# Patient Record
Sex: FEMALE | Race: WHITE | Employment: PART TIME | URBAN - METROPOLITAN AREA
[De-identification: names, ages, dates, MRNs, and addresses within clinical notes are randomized per-mention and may not be internally consistent; named-entity substitution may affect disease eponyms.]

---

## 2023-09-05 ENCOUNTER — HOSPITAL ENCOUNTER (EMERGENCY)
Age: 38
Discharge: HOME | End: 2023-09-05
Attending: STUDENT IN AN ORGANIZED HEALTH CARE EDUCATION/TRAINING PROGRAM
Payer: COMMERCIAL

## 2023-09-05 ENCOUNTER — APPOINTMENT (OUTPATIENT)
Dept: GENERAL RADIOLOGY | Age: 38
End: 2023-09-05
Payer: COMMERCIAL

## 2023-09-05 VITALS
OXYGEN SATURATION: 98 % | WEIGHT: 293 LBS | DIASTOLIC BLOOD PRESSURE: 61 MMHG | TEMPERATURE: 98 F | BODY MASS INDEX: 45.99 KG/M2 | HEIGHT: 67 IN | HEART RATE: 58 BPM | RESPIRATION RATE: 20 BRPM | SYSTOLIC BLOOD PRESSURE: 116 MMHG

## 2023-09-05 DIAGNOSIS — R07.9 CHEST PAIN, UNSPECIFIED TYPE: Primary | ICD-10-CM

## 2023-09-05 LAB
ALBUMIN SERPL-MCNC: 4.4 G/DL (ref 3.4–5)
ALBUMIN/GLOB SERPL: 1.7 {RATIO} (ref 1.1–2.2)
ALP SERPL-CCNC: 48 U/L (ref 40–129)
ALT SERPL-CCNC: 13 U/L (ref 10–40)
ANION GAP SERPL CALCULATED.3IONS-SCNC: 8 MMOL/L (ref 3–16)
AST SERPL-CCNC: 14 U/L (ref 15–37)
BASOPHILS # BLD: 0.1 K/UL (ref 0–0.2)
BASOPHILS NFR BLD: 0.7 %
BILIRUB SERPL-MCNC: 0.5 MG/DL (ref 0–1)
BUN SERPL-MCNC: 20 MG/DL (ref 7–20)
CALCIUM SERPL-MCNC: 8.9 MG/DL (ref 8.3–10.6)
CHLORIDE SERPL-SCNC: 108 MMOL/L (ref 99–110)
CO2 SERPL-SCNC: 27 MMOL/L (ref 21–32)
CREAT SERPL-MCNC: 0.5 MG/DL (ref 0.6–1.1)
DEPRECATED RDW RBC AUTO: 13.1 % (ref 12.4–15.4)
EKG ATRIAL RATE: 52 BPM
EKG DIAGNOSIS: NORMAL
EKG P AXIS: 44 DEGREES
EKG P-R INTERVAL: 160 MS
EKG Q-T INTERVAL: 468 MS
EKG QRS DURATION: 82 MS
EKG QTC CALCULATION (BAZETT): 435 MS
EKG R AXIS: 21 DEGREES
EKG T AXIS: 0 DEGREES
EKG VENTRICULAR RATE: 52 BPM
EOSINOPHIL # BLD: 0.1 K/UL (ref 0–0.6)
EOSINOPHIL NFR BLD: 1.5 %
GFR SERPLBLD CREATININE-BSD FMLA CKD-EPI: >60 ML/MIN/{1.73_M2}
GLUCOSE SERPL-MCNC: 102 MG/DL (ref 70–99)
HCT VFR BLD AUTO: 37.6 % (ref 36–48)
HGB BLD-MCNC: 13 G/DL (ref 12–16)
LIPASE SERPL-CCNC: 61 U/L (ref 13–60)
LYMPHOCYTES # BLD: 2.6 K/UL (ref 1–5.1)
LYMPHOCYTES NFR BLD: 33.1 %
MCH RBC QN AUTO: 29.4 PG (ref 26–34)
MCHC RBC AUTO-ENTMCNC: 34.6 G/DL (ref 31–36)
MCV RBC AUTO: 84.9 FL (ref 80–100)
MONOCYTES # BLD: 0.7 K/UL (ref 0–1.3)
MONOCYTES NFR BLD: 9.5 %
NEUTROPHILS # BLD: 4.3 K/UL (ref 1.7–7.7)
NEUTROPHILS NFR BLD: 55.2 %
NT-PROBNP SERPL-MCNC: 60 PG/ML (ref 0–124)
PLATELET # BLD AUTO: 229 K/UL (ref 135–450)
PMV BLD AUTO: 8.1 FL (ref 5–10.5)
POTASSIUM SERPL-SCNC: 4.1 MMOL/L (ref 3.5–5.1)
PROT SERPL-MCNC: 7 G/DL (ref 6.4–8.2)
RBC # BLD AUTO: 4.43 M/UL (ref 4–5.2)
SODIUM SERPL-SCNC: 143 MMOL/L (ref 136–145)
TROPONIN, HIGH SENSITIVITY: <6 NG/L (ref 0–14)
TROPONIN, HIGH SENSITIVITY: <6 NG/L (ref 0–14)
WBC # BLD AUTO: 7.7 K/UL (ref 4–11)

## 2023-09-05 PROCEDURE — 80053 COMPREHEN METABOLIC PANEL: CPT

## 2023-09-05 PROCEDURE — 99285 EMERGENCY DEPT VISIT HI MDM: CPT

## 2023-09-05 PROCEDURE — 6370000000 HC RX 637 (ALT 250 FOR IP): Performed by: STUDENT IN AN ORGANIZED HEALTH CARE EDUCATION/TRAINING PROGRAM

## 2023-09-05 PROCEDURE — 93010 ELECTROCARDIOGRAM REPORT: CPT | Performed by: INTERNAL MEDICINE

## 2023-09-05 PROCEDURE — 71046 X-RAY EXAM CHEST 2 VIEWS: CPT

## 2023-09-05 PROCEDURE — 93005 ELECTROCARDIOGRAM TRACING: CPT | Performed by: STUDENT IN AN ORGANIZED HEALTH CARE EDUCATION/TRAINING PROGRAM

## 2023-09-05 PROCEDURE — 36415 COLL VENOUS BLD VENIPUNCTURE: CPT

## 2023-09-05 PROCEDURE — 83880 ASSAY OF NATRIURETIC PEPTIDE: CPT

## 2023-09-05 PROCEDURE — 85025 COMPLETE CBC W/AUTO DIFF WBC: CPT

## 2023-09-05 PROCEDURE — 84484 ASSAY OF TROPONIN QUANT: CPT

## 2023-09-05 PROCEDURE — 83690 ASSAY OF LIPASE: CPT

## 2023-09-05 RX ORDER — CETIRIZINE HYDROCHLORIDE 10 MG/1
10 TABLET ORAL DAILY
COMMUNITY
Start: 2021-08-04

## 2023-09-05 RX ORDER — ASPIRIN 81 MG/1
324 TABLET, CHEWABLE ORAL ONCE
Status: COMPLETED | OUTPATIENT
Start: 2023-09-05 | End: 2023-09-05

## 2023-09-05 RX ADMIN — ASPIRIN 324 MG: 81 TABLET, CHEWABLE ORAL at 01:47

## 2023-09-05 ASSESSMENT — PAIN DESCRIPTION - PAIN TYPE: TYPE: ACUTE PAIN

## 2023-09-05 ASSESSMENT — PAIN - FUNCTIONAL ASSESSMENT
PAIN_FUNCTIONAL_ASSESSMENT: NONE - DENIES PAIN
PAIN_FUNCTIONAL_ASSESSMENT: 0-10
PAIN_FUNCTIONAL_ASSESSMENT: ACTIVITIES ARE NOT PREVENTED

## 2023-09-05 ASSESSMENT — PAIN DESCRIPTION - FREQUENCY: FREQUENCY: INTERMITTENT

## 2023-09-05 ASSESSMENT — PAIN DESCRIPTION - DESCRIPTORS: DESCRIPTORS: CRAMPING;SHARP

## 2023-09-05 ASSESSMENT — PAIN DESCRIPTION - ONSET: ONSET: AWAKENED FROM SLEEP

## 2023-09-05 ASSESSMENT — PAIN DESCRIPTION - LOCATION: LOCATION: CHEST;BREAST

## 2023-09-05 ASSESSMENT — PAIN DESCRIPTION - ORIENTATION: ORIENTATION: LEFT

## 2023-09-05 ASSESSMENT — HEART SCORE: ECG: 1

## 2023-09-05 ASSESSMENT — PAIN SCALES - GENERAL: PAINLEVEL_OUTOF10: 1

## 2023-09-05 NOTE — DISCHARGE INSTRUCTIONS
Take your medications as prescribed. Follow-up with your family doctor as discussed for evaluation of your symptoms as well as your recent visit here to this emergency department. Discussed possible testing of your thyroid. Return if you develop any new or worsening symptoms.

## 2023-09-05 NOTE — ED PROVIDER NOTES
[address] [place] [address]      EMERGENCY MEDICINE     Pt Name: Erika Erwin  MRN: [id number]  [address] [facility] 1985  Date of evaluation: 9/5/2023  Provider: Pablito Demarco MD    CHIEF COMPLAINT       Chief Complaint   Patient presents with    Chest Pain     Pt states she was awakened by sternal chest pain which was low center radiating around left breast. She states she got up and took TUMS without relief and vomited x 1. Upon arrival pain had subsided to a 1/10. No hx of chest pain per pt. HISTORY OF PRESENT ILLNESS   Erika Erwin is a 34 y.o. female who presents to the emergency department for substernal chest pain began a few hours ago when returning to sleep, rating to her left side of her chest wall nonexertional nonpleuritic in nature but associated with 1 episode of nonbilious nonbloody emesis and some diaphoresis. The pain is significantly improved now and is no longer significantly present. No family history of any cardiac disease. No cough runny nose or fever.       PASTMEDICAL HISTORY     Past Medical History:   Diagnosis Date    Irritable bowel syndrome     Morbid obesity with body mass index of 60.0-69.9 in adult [facility])     Vitamin D deficiency        Patient Active Problem List   Diagnosis Code    Chronic back pain M54.9, G89.29    Irritable bowel syndrome K58.9    Vitamin D deficiency E55.9    Morbid obesity with body mass index of 60.0-69.9 in adult ([address] [facility]) E66.01, Z68.44     SURGICAL HISTORY       Past Surgical History:   Procedure Laterality Date    ELBOW SURGERY  07/01/2004    GOOD PÉREZ    GASTRIC RESTRICTION SURGERY         CURRENT MEDICATIONS       Previous Medications    ACYCLOVIR (ZOVIRAX) 200 MG CAPSULE    Take 1 capsule by mouth daily    CETIRIZINE (ZYRTEC) 10 MG TABLET    Take 1 tablet by mouth daily    ERGOCALCIFEROL (DRISDOL) 34167 UNITS CAPSULE    Take 1 capsule by mouth once a week    LEVONORGESTREL (MIRENA) IUD 52 MG    1 Intra Uterine Device by

## 2023-12-23 ENCOUNTER — HOSPITAL ENCOUNTER (OUTPATIENT)
Facility: HOSPITAL | Age: 38
Discharge: HOME | End: 2023-12-25
Attending: EMERGENCY MEDICINE | Admitting: FAMILY MEDICINE

## 2023-12-23 DIAGNOSIS — R07.9 CHEST PAIN: ICD-10-CM

## 2023-12-23 DIAGNOSIS — R10.13 EPIGASTRIC PAIN: ICD-10-CM

## 2023-12-23 DIAGNOSIS — R55 SYNCOPE, UNSPECIFIED SYNCOPE TYPE: Primary | ICD-10-CM

## 2023-12-23 DIAGNOSIS — R53.1 WEAKNESS: ICD-10-CM

## 2023-12-23 PROBLEM — R10.9 ABDOMINAL PAIN: Status: ACTIVE | Noted: 2023-12-23

## 2023-12-23 LAB
ALBUMIN SERPL BCP-MCNC: 4.2 G/DL (ref 3.5–5.2)
ALP SERPL-CCNC: 118 U/L (ref 55–135)
ALT SERPL W/O P-5'-P-CCNC: 52 U/L (ref 10–44)
AMPHET+METHAMPHET UR QL: NEGATIVE
ANION GAP SERPL CALC-SCNC: 12 MMOL/L (ref 8–16)
AST SERPL-CCNC: 23 U/L (ref 10–40)
B-HCG UR QL: NEGATIVE
BARBITURATES UR QL SCN>200 NG/ML: NEGATIVE
BASOPHILS # BLD AUTO: 0.07 K/UL (ref 0–0.2)
BASOPHILS NFR BLD: 0.6 % (ref 0–1.9)
BENZODIAZ UR QL SCN>200 NG/ML: NEGATIVE
BILIRUB SERPL-MCNC: 0.5 MG/DL (ref 0.1–1)
BILIRUB UR QL STRIP: NEGATIVE
BNP SERPL-MCNC: 39 PG/ML (ref 0–99)
BUN SERPL-MCNC: 12 MG/DL (ref 6–20)
BZE UR QL SCN: NEGATIVE
CALCIUM SERPL-MCNC: 9.6 MG/DL (ref 8.7–10.5)
CANNABINOIDS UR QL SCN: NEGATIVE
CHLORIDE SERPL-SCNC: 105 MMOL/L (ref 95–110)
CLARITY UR: ABNORMAL
CO2 SERPL-SCNC: 24 MMOL/L (ref 23–29)
COLOR UR: YELLOW
CREAT SERPL-MCNC: 0.8 MG/DL (ref 0.5–1.4)
CREAT UR-MCNC: 147 MG/DL (ref 15–325)
DIFFERENTIAL METHOD: ABNORMAL
EOSINOPHIL # BLD AUTO: 0.1 K/UL (ref 0–0.5)
EOSINOPHIL NFR BLD: 1 % (ref 0–8)
ERYTHROCYTE [DISTWIDTH] IN BLOOD BY AUTOMATED COUNT: 12.8 % (ref 11.5–14.5)
EST. GFR  (NO RACE VARIABLE): >60 ML/MIN/1.73 M^2
ETHANOL SERPL-MCNC: <10 MG/DL
GLUCOSE SERPL-MCNC: 109 MG/DL (ref 70–110)
GLUCOSE UR QL STRIP: NEGATIVE
HCT VFR BLD AUTO: 42.2 % (ref 37–48.5)
HCV AB SERPL QL IA: NEGATIVE
HEP C VIRUS HOLD SPECIMEN: NORMAL
HGB BLD-MCNC: 14.1 G/DL (ref 12–16)
HGB UR QL STRIP: NEGATIVE
HIV 1+2 AB+HIV1 P24 AG SERPL QL IA: NEGATIVE
IMM GRANULOCYTES # BLD AUTO: 0.03 K/UL (ref 0–0.04)
IMM GRANULOCYTES NFR BLD AUTO: 0.3 % (ref 0–0.5)
INFLUENZA A, MOLECULAR: NEGATIVE
INFLUENZA B, MOLECULAR: NEGATIVE
KETONES UR QL STRIP: NEGATIVE
LACTATE SERPL-SCNC: 0.9 MMOL/L (ref 0.5–2.2)
LACTATE SERPL-SCNC: 1.3 MMOL/L (ref 0.5–2.2)
LEUKOCYTE ESTERASE UR QL STRIP: NEGATIVE
LIPASE SERPL-CCNC: 37 U/L (ref 4–60)
LYMPHOCYTES # BLD AUTO: 2.4 K/UL (ref 1–4.8)
LYMPHOCYTES NFR BLD: 20.6 % (ref 18–48)
MCH RBC QN AUTO: 28.8 PG (ref 27–31)
MCHC RBC AUTO-ENTMCNC: 33.4 G/DL (ref 32–36)
MCV RBC AUTO: 86 FL (ref 82–98)
METHADONE UR QL SCN>300 NG/ML: NEGATIVE
MONOCYTES # BLD AUTO: 0.5 K/UL (ref 0.3–1)
MONOCYTES NFR BLD: 4.5 % (ref 4–15)
NEUTROPHILS # BLD AUTO: 8.4 K/UL (ref 1.8–7.7)
NEUTROPHILS NFR BLD: 73 % (ref 38–73)
NITRITE UR QL STRIP: NEGATIVE
NRBC BLD-RTO: 0 /100 WBC
OPIATES UR QL SCN: NEGATIVE
PCP UR QL SCN>25 NG/ML: NEGATIVE
PH UR STRIP: 8 [PH] (ref 5–8)
PLATELET # BLD AUTO: 365 K/UL (ref 150–450)
PMV BLD AUTO: 10.8 FL (ref 9.2–12.9)
POTASSIUM SERPL-SCNC: 4.4 MMOL/L (ref 3.5–5.1)
PROCALCITONIN SERPL IA-MCNC: 0.03 NG/ML
PROT SERPL-MCNC: 7.9 G/DL (ref 6–8.4)
PROT UR QL STRIP: ABNORMAL
RBC # BLD AUTO: 4.89 M/UL (ref 4–5.4)
SARS-COV-2 RDRP RESP QL NAA+PROBE: NEGATIVE
SODIUM SERPL-SCNC: 141 MMOL/L (ref 136–145)
SP GR UR STRIP: 1.02 (ref 1–1.03)
SPECIMEN SOURCE: NORMAL
TOXICOLOGY INFORMATION: NORMAL
TROPONIN I SERPL DL<=0.01 NG/ML-MCNC: <0.006 NG/ML (ref 0–0.03)
TSH SERPL DL<=0.005 MIU/L-ACNC: 1.51 UIU/ML (ref 0.4–4)
URN SPEC COLLECT METH UR: ABNORMAL
UROBILINOGEN UR STRIP-ACNC: ABNORMAL EU/DL
WBC # BLD AUTO: 11.46 K/UL (ref 3.9–12.7)

## 2023-12-23 PROCEDURE — G0378 HOSPITAL OBSERVATION PER HR: HCPCS

## 2023-12-23 PROCEDURE — 96361 HYDRATE IV INFUSION ADD-ON: CPT

## 2023-12-23 PROCEDURE — 86803 HEPATITIS C AB TEST: CPT | Performed by: EMERGENCY MEDICINE

## 2023-12-23 PROCEDURE — 25000003 PHARM REV CODE 250: Performed by: FAMILY MEDICINE

## 2023-12-23 PROCEDURE — 80307 DRUG TEST PRSMV CHEM ANLYZR: CPT | Performed by: EMERGENCY MEDICINE

## 2023-12-23 PROCEDURE — 96375 TX/PRO/DX INJ NEW DRUG ADDON: CPT

## 2023-12-23 PROCEDURE — 82077 ASSAY SPEC XCP UR&BREATH IA: CPT | Performed by: EMERGENCY MEDICINE

## 2023-12-23 PROCEDURE — 84484 ASSAY OF TROPONIN QUANT: CPT | Mod: 91 | Performed by: EMERGENCY MEDICINE

## 2023-12-23 PROCEDURE — 84484 ASSAY OF TROPONIN QUANT: CPT | Performed by: FAMILY MEDICINE

## 2023-12-23 PROCEDURE — 83605 ASSAY OF LACTIC ACID: CPT | Performed by: FAMILY MEDICINE

## 2023-12-23 PROCEDURE — 85025 COMPLETE CBC W/AUTO DIFF WBC: CPT | Performed by: EMERGENCY MEDICINE

## 2023-12-23 PROCEDURE — 80053 COMPREHEN METABOLIC PANEL: CPT | Performed by: EMERGENCY MEDICINE

## 2023-12-23 PROCEDURE — 84443 ASSAY THYROID STIM HORMONE: CPT | Performed by: EMERGENCY MEDICINE

## 2023-12-23 PROCEDURE — 93005 ELECTROCARDIOGRAM TRACING: CPT

## 2023-12-23 PROCEDURE — 83880 ASSAY OF NATRIURETIC PEPTIDE: CPT | Performed by: EMERGENCY MEDICINE

## 2023-12-23 PROCEDURE — 96360 HYDRATION IV INFUSION INIT: CPT | Mod: 59

## 2023-12-23 PROCEDURE — 83605 ASSAY OF LACTIC ACID: CPT | Mod: 91 | Performed by: EMERGENCY MEDICINE

## 2023-12-23 PROCEDURE — 63600175 PHARM REV CODE 636 W HCPCS: Performed by: EMERGENCY MEDICINE

## 2023-12-23 PROCEDURE — 87502 INFLUENZA DNA AMP PROBE: CPT | Performed by: EMERGENCY MEDICINE

## 2023-12-23 PROCEDURE — 93010 EKG 12-LEAD: ICD-10-PCS | Mod: ,,, | Performed by: INTERNAL MEDICINE

## 2023-12-23 PROCEDURE — U0002 COVID-19 LAB TEST NON-CDC: HCPCS | Performed by: EMERGENCY MEDICINE

## 2023-12-23 PROCEDURE — 99285 EMERGENCY DEPT VISIT HI MDM: CPT | Mod: 25

## 2023-12-23 PROCEDURE — 87040 BLOOD CULTURE FOR BACTERIA: CPT | Mod: 59 | Performed by: EMERGENCY MEDICINE

## 2023-12-23 PROCEDURE — 93010 ELECTROCARDIOGRAM REPORT: CPT | Mod: ,,, | Performed by: INTERNAL MEDICINE

## 2023-12-23 PROCEDURE — 81025 URINE PREGNANCY TEST: CPT | Performed by: EMERGENCY MEDICINE

## 2023-12-23 PROCEDURE — 84145 PROCALCITONIN (PCT): CPT | Performed by: EMERGENCY MEDICINE

## 2023-12-23 PROCEDURE — 36415 COLL VENOUS BLD VENIPUNCTURE: CPT | Performed by: FAMILY MEDICINE

## 2023-12-23 PROCEDURE — 83690 ASSAY OF LIPASE: CPT | Performed by: EMERGENCY MEDICINE

## 2023-12-23 PROCEDURE — 96374 THER/PROPH/DIAG INJ IV PUSH: CPT

## 2023-12-23 PROCEDURE — 82962 GLUCOSE BLOOD TEST: CPT

## 2023-12-23 PROCEDURE — 81003 URINALYSIS AUTO W/O SCOPE: CPT | Mod: 59 | Performed by: EMERGENCY MEDICINE

## 2023-12-23 PROCEDURE — 87389 HIV-1 AG W/HIV-1&-2 AB AG IA: CPT | Performed by: EMERGENCY MEDICINE

## 2023-12-23 RX ORDER — SODIUM CHLORIDE 0.9 % (FLUSH) 0.9 %
10 SYRINGE (ML) INJECTION EVERY 12 HOURS PRN
Status: DISCONTINUED | OUTPATIENT
Start: 2023-12-23 | End: 2023-12-25 | Stop reason: HOSPADM

## 2023-12-23 RX ORDER — ONDANSETRON 8 MG/1
8 TABLET, ORALLY DISINTEGRATING ORAL EVERY 8 HOURS PRN
Status: DISCONTINUED | OUTPATIENT
Start: 2023-12-23 | End: 2023-12-25 | Stop reason: HOSPADM

## 2023-12-23 RX ORDER — IBUPROFEN 200 MG
24 TABLET ORAL
Status: DISCONTINUED | OUTPATIENT
Start: 2023-12-23 | End: 2023-12-25 | Stop reason: HOSPADM

## 2023-12-23 RX ORDER — ONDANSETRON 2 MG/ML
4 INJECTION INTRAMUSCULAR; INTRAVENOUS
Status: COMPLETED | OUTPATIENT
Start: 2023-12-23 | End: 2023-12-23

## 2023-12-23 RX ORDER — ENOXAPARIN SODIUM 100 MG/ML
40 INJECTION SUBCUTANEOUS EVERY 24 HOURS
Status: DISCONTINUED | OUTPATIENT
Start: 2023-12-23 | End: 2023-12-23

## 2023-12-23 RX ORDER — HYDROCODONE BITARTRATE AND ACETAMINOPHEN 5; 325 MG/1; MG/1
1 TABLET ORAL EVERY 6 HOURS PRN
Status: DISCONTINUED | OUTPATIENT
Start: 2023-12-23 | End: 2023-12-25

## 2023-12-23 RX ORDER — MORPHINE SULFATE 4 MG/ML
4 INJECTION, SOLUTION INTRAMUSCULAR; INTRAVENOUS
Status: COMPLETED | OUTPATIENT
Start: 2023-12-23 | End: 2023-12-23

## 2023-12-23 RX ORDER — PROCHLORPERAZINE EDISYLATE 5 MG/ML
5 INJECTION INTRAMUSCULAR; INTRAVENOUS EVERY 6 HOURS PRN
Status: DISCONTINUED | OUTPATIENT
Start: 2023-12-23 | End: 2023-12-25 | Stop reason: HOSPADM

## 2023-12-23 RX ORDER — IBUPROFEN 200 MG
16 TABLET ORAL
Status: DISCONTINUED | OUTPATIENT
Start: 2023-12-23 | End: 2023-12-25 | Stop reason: HOSPADM

## 2023-12-23 RX ORDER — HYDROMORPHONE HYDROCHLORIDE 2 MG/ML
1 INJECTION, SOLUTION INTRAMUSCULAR; INTRAVENOUS; SUBCUTANEOUS
Status: COMPLETED | OUTPATIENT
Start: 2023-12-23 | End: 2023-12-23

## 2023-12-23 RX ORDER — SODIUM CHLORIDE 9 MG/ML
INJECTION, SOLUTION INTRAVENOUS CONTINUOUS
Status: DISCONTINUED | OUTPATIENT
Start: 2023-12-23 | End: 2023-12-24

## 2023-12-23 RX ORDER — ACETAMINOPHEN 325 MG/1
650 TABLET ORAL EVERY 4 HOURS PRN
Status: DISCONTINUED | OUTPATIENT
Start: 2023-12-23 | End: 2023-12-25 | Stop reason: HOSPADM

## 2023-12-23 RX ORDER — ENOXAPARIN SODIUM 100 MG/ML
40 INJECTION SUBCUTANEOUS EVERY 12 HOURS
Status: DISCONTINUED | OUTPATIENT
Start: 2023-12-23 | End: 2023-12-25 | Stop reason: HOSPADM

## 2023-12-23 RX ORDER — GLUCAGON 1 MG
1 KIT INJECTION
Status: DISCONTINUED | OUTPATIENT
Start: 2023-12-23 | End: 2023-12-25 | Stop reason: HOSPADM

## 2023-12-23 RX ORDER — NALOXONE HCL 0.4 MG/ML
0.02 VIAL (ML) INJECTION
Status: DISCONTINUED | OUTPATIENT
Start: 2023-12-23 | End: 2023-12-25 | Stop reason: HOSPADM

## 2023-12-23 RX ADMIN — SODIUM CHLORIDE: 9 INJECTION, SOLUTION INTRAVENOUS at 12:12

## 2023-12-23 RX ADMIN — ONDANSETRON 4 MG: 2 INJECTION INTRAMUSCULAR; INTRAVENOUS at 08:12

## 2023-12-23 RX ADMIN — SODIUM CHLORIDE: 9 INJECTION, SOLUTION INTRAVENOUS at 10:12

## 2023-12-23 RX ADMIN — MORPHINE SULFATE 4 MG: 4 INJECTION INTRAVENOUS at 09:12

## 2023-12-23 RX ADMIN — HYDROCODONE BITARTRATE AND ACETAMINOPHEN 1 TABLET: 5; 325 TABLET ORAL at 10:12

## 2023-12-23 RX ADMIN — HYDROCODONE BITARTRATE AND ACETAMINOPHEN 1 TABLET: 5; 325 TABLET ORAL at 04:12

## 2023-12-23 RX ADMIN — HYDROMORPHONE HYDROCHLORIDE 1 MG: 2 INJECTION INTRAMUSCULAR; INTRAVENOUS; SUBCUTANEOUS at 09:12

## 2023-12-23 NOTE — HPI
Ms. Nassar is a 37 yo female with no past medical hx presented with epigastric pain, nausea and vomiting since 3AM.  She states she went out to eat last night were she ate steak and shrimp fajitas (no other family members ate the same food).  She felt fine after her meal, but around 3AM she woke up out of deep sleep with severe abdominal pain that shot straight down.  She continued to have several episodes of vomiting and passed out in the car on the way to the hospital.  Work up has been unrevealing thus far.  Her CT abdomen revealed a calcified gallbladder at the fundus, but no acute cholecystitis.  A US gallbladder is pending.  Remainder of her labs and imaging were unremarkable.  She does have b/l upper quadrant tenderness, but is no longer vomiting.  Unclear if this is related acute gastritis from her dinner last night.  Will admit for observation, npo, gallbladder US, pain control, and repeat troponins.

## 2023-12-23 NOTE — ED PROVIDER NOTES
SCRIBE #1 NOTE: I, Parker Barrett, am scribing for, and in the presence of, Jose Ann MD. I have scribed the entire note.       History     Chief Complaint   Patient presents with    Loss of Consciousness     Patient with c/o vomiting, responsive only to sternal rub on presentation to ED and diaphoretic.     Review of patient's allergies indicates:   Allergen Reactions    Penicillins          History of Present Illness     HPI    12/23/2023, 8:33 AM  History obtained from the patient  HPI, ROS, and PE limited due to LOC      History of Present Illness: Patsy Nassar is a 38 y.o. female patient who presents to the Emergency Department for evaluation of LOC which onset PTA. Pt responsive only to sternal rub on presentation to ED. Symptoms are constant and moderate in severity. No mitigating or exacerbating factors reported. Associated sxs include vomiting and diaphoresis. No prior Tx reported. No further complaints or concerns at this time.       Arrival mode: Personal vehicle    PCP: No, Primary Doctor        Past Medical History:  No past medical history on file.    Past Surgical History:  No past surgical history on file.      Family History:  No family history on file.    Social History:  Social History     Tobacco Use    Smoking status: Not on file    Smokeless tobacco: Not on file   Substance and Sexual Activity    Alcohol use: Not on file    Drug use: Not on file    Sexual activity: Not on file        Review of Systems     Review of Systems   Constitutional:  Positive for diaphoresis.   Gastrointestinal:  Positive for vomiting.   Skin:  Negative for rash.   Neurological:  Positive for syncope.   ROS limited due to LOC.     Physical Exam     Initial Vitals   BP Pulse Resp Temp SpO2   12/23/23 0816 12/23/23 0816 12/23/23 0816 12/23/23 0822 12/23/23 0816   114/75 63 (!) 24 96.9 °F (36.1 °C) 100 %      MAP       --                 Physical Exam  Nursing Notes and Vital Signs Reviewed.  Constitutional: Pt is  obese and only responds to painful stimuli.   Head: Atraumatic. Normocephalic.  Eyes: PERRL. EOM intact. Conjunctivae are not pale. No scleral icterus. Pupils react to light.  ENT: Mucous membranes are moist. Oropharynx is clear and symmetric.    Neck: Supple. Full ROM. No lymphadenopathy.  Cardiovascular: Regular rate. Regular rhythm. No murmurs, rubs, or gallops. Distal pulses are 2+ and symmetric.  Pulmonary/Chest: No respiratory distress. Clear to auscultation bilaterally. No wheezing or rales.  Abdominal: Soft and non-distended.  There is no tenderness.  No rebound, guarding, or rigidity. Good bowel sounds.  Genitourinary: No CVA tenderness  Musculoskeletal: Moves all extremities. No obvious deformities. No edema. No calf tenderness.  Skin: Warm and dry.  Neurological:  LOC. No acute focal neurological deficits are appreciated.  PE limited due to LOC.     ED Course   Critical Care    Date/Time: 12/23/2023 12:13 PM    Performed by: Jose Ann MD  Authorized by: Jose Ann MD  Direct patient critical care time: 35 minutes  Additional history critical care time: 7 minutes  Ordering / reviewing critical care time: 5 minutes  Documentation critical care time: 5 minutes  Consulting other physicians critical care time: 4 minutes  Consult with family critical care time: 4 minutes  Total critical care time (exclusive of procedural time) : 60 minutes  Critical care time was exclusive of separately billable procedures and treating other patients and teaching time.  Critical care was necessary to treat or prevent imminent or life-threatening deterioration of the following conditions: syncope.  Critical care was time spent personally by me on the following activities: blood draw for specimens, development of treatment plan with patient or surrogate, discussions with consultants, interpretation of cardiac output measurements, evaluation of patient's response to treatment, pulse oximetry, re-evaluation of  "patient's condition, review of old charts, ordering and review of radiographic studies, ordering and review of laboratory studies, ordering and performing treatments and interventions, obtaining history from patient or surrogate and examination of patient.        ED Vital Signs:  Vitals:    12/23/23 0816 12/23/23 0819 12/23/23 0822 12/23/23 0900   BP: 114/75   126/60   Pulse: 63 70  61   Resp: (!) 24   (!) 28   Temp:   96.9 °F (36.1 °C)    TempSrc:   Axillary    SpO2: 100%   100%   Weight: 104 kg (229 lb 4.5 oz)      Height: 5' 3" (1.6 m)       12/23/23 0901 12/23/23 0951 12/23/23 1000 12/23/23 1100   BP:   121/65 (!) 151/80   Pulse:   60 (!) 56   Resp: 20 (!) 24 14 20   Temp:       TempSrc:       SpO2:   97% 100%   Weight:       Height:           Abnormal Lab Results:  Labs Reviewed   CBC W/ AUTO DIFFERENTIAL - Abnormal; Notable for the following components:       Result Value    Gran # (ANC) 8.4 (*)     All other components within normal limits    Narrative:     Release to patient->Immediate   COMPREHENSIVE METABOLIC PANEL - Abnormal; Notable for the following components:    ALT 52 (*)     All other components within normal limits    Narrative:     Release to patient->Immediate   URINALYSIS, REFLEX TO URINE CULTURE - Abnormal; Notable for the following components:    Appearance, UA Hazy (*)     Protein, UA Trace (*)     Urobilinogen, UA 2.0-3.0 (*)     All other components within normal limits    Narrative:     Specimen Source->Urine   INFLUENZA A & B BY MOLECULAR   CULTURE, BLOOD   CULTURE, BLOOD   LACTIC ACID, PLASMA    Narrative:     Release to patient->Immediate   TROPONIN I    Narrative:     Release to patient->Immediate   PROCALCITONIN    Narrative:     Release to patient->Immediate   LIPASE    Narrative:     Release to patient->Immediate   B-TYPE NATRIURETIC PEPTIDE    Narrative:     Release to patient->Immediate   SARS-COV-2 RNA AMPLIFICATION, QUAL   DRUG SCREEN PANEL, URINE EMERGENCY   TSH    Narrative:  "    Release to patient->Immediate   ALCOHOL,MEDICAL (ETHANOL)    Narrative:     Release to patient->Immediate   HIV 1 / 2 ANTIBODY    Narrative:     Release to patient->Immediate   HEPATITIS C ANTIBODY    Narrative:     Release to patient->Immediate   HEP C VIRUS HOLD SPECIMEN    Narrative:     Release to patient->Immediate   PREGNANCY TEST, URINE RAPID   PREGNANCY TEST, URINE RAPID    Narrative:     Specimen Source->Urine   LACTIC ACID, PLASMA   TROPONIN I        All Lab Results:  Results for orders placed or performed during the hospital encounter of 12/23/23   Influenza A & B by Molecular    Specimen: Nasopharyngeal Swab   Result Value Ref Range    Influenza A, Molecular Negative Negative    Influenza B, Molecular Negative Negative    Flu A & B Source Nasal swab    CBC auto differential   Result Value Ref Range    WBC 11.46 3.90 - 12.70 K/uL    RBC 4.89 4.00 - 5.40 M/uL    Hemoglobin 14.1 12.0 - 16.0 g/dL    Hematocrit 42.2 37.0 - 48.5 %    MCV 86 82 - 98 fL    MCH 28.8 27.0 - 31.0 pg    MCHC 33.4 32.0 - 36.0 g/dL    RDW 12.8 11.5 - 14.5 %    Platelets 365 150 - 450 K/uL    MPV 10.8 9.2 - 12.9 fL    Immature Granulocytes 0.3 0.0 - 0.5 %    Gran # (ANC) 8.4 (H) 1.8 - 7.7 K/uL    Immature Grans (Abs) 0.03 0.00 - 0.04 K/uL    Lymph # 2.4 1.0 - 4.8 K/uL    Mono # 0.5 0.3 - 1.0 K/uL    Eos # 0.1 0.0 - 0.5 K/uL    Baso # 0.07 0.00 - 0.20 K/uL    nRBC 0 0 /100 WBC    Gran % 73.0 38.0 - 73.0 %    Lymph % 20.6 18.0 - 48.0 %    Mono % 4.5 4.0 - 15.0 %    Eosinophil % 1.0 0.0 - 8.0 %    Basophil % 0.6 0.0 - 1.9 %    Differential Method Automated    Comprehensive metabolic panel   Result Value Ref Range    Sodium 141 136 - 145 mmol/L    Potassium 4.4 3.5 - 5.1 mmol/L    Chloride 105 95 - 110 mmol/L    CO2 24 23 - 29 mmol/L    Glucose 109 70 - 110 mg/dL    BUN 12 6 - 20 mg/dL    Creatinine 0.8 0.5 - 1.4 mg/dL    Calcium 9.6 8.7 - 10.5 mg/dL    Total Protein 7.9 6.0 - 8.4 g/dL    Albumin 4.2 3.5 - 5.2 g/dL    Total Bilirubin  0.5 0.1 - 1.0 mg/dL    Alkaline Phosphatase 118 55 - 135 U/L    AST 23 10 - 40 U/L    ALT 52 (H) 10 - 44 U/L    eGFR >60 >60 mL/min/1.73 m^2    Anion Gap 12 8 - 16 mmol/L   Lactic acid, plasma #1   Result Value Ref Range    Lactate (Lactic Acid) 1.3 0.5 - 2.2 mmol/L   Urinalysis, Reflex to Urine Culture Urine, Clean Catch    Specimen: Urine   Result Value Ref Range    Specimen UA Urine, Clean Catch     Color, UA Yellow Yellow, Straw, [place]    Appearance, UA Hazy (A) Clear    pH, UA 8.0 5.0 - 8.0    Specific Gravity, UA 1.025 1.005 - 1.030    Protein, UA Trace (A) Negative    Glucose, UA Negative Negative    Ketones, UA Negative Negative    Bilirubin (UA) Negative Negative    Occult Blood UA Negative Negative    Nitrite, UA Negative Negative    Urobilinogen, UA 2.0-3.0 (A) <2.0 EU/dL    Leukocytes, UA Negative Negative   Troponin I   Result Value Ref Range    Troponin I <0.006 0.000 - 0.026 ng/mL   Procalcitonin   Result Value Ref Range    Procalcitonin 0.03 <0.25 ng/mL   Lipase   Result Value Ref Range    Lipase 37 4 - 60 U/L   Brain natriuretic peptide   Result Value Ref Range    BNP 39 0 - 99 pg/mL   COVID-19 Rapid Screening   Result Value Ref Range    SARS-CoV-2 RNA, Amplification, Qual Negative Negative   Drug screen panel, in-house   Result Value Ref Range    Benzodiazepines Negative Negative    Methadone metabolites Negative Negative    Cocaine (Metab.) Negative Negative    Opiate Scrn, Ur Negative Negative    Barbiturate Screen, Ur Negative Negative    Amphetamine Screen, Ur Negative Negative    THC Negative Negative    Phencyclidine Negative Negative    Creatinine, Urine 147.0 15.0 - 325.0 mg/dL    Toxicology Information SEE COMMENT    TSH   Result Value Ref Range    TSH 1.514 0.400 - 4.000 uIU/mL   Ethanol   Result Value Ref Range    Alcohol, Serum <10 <10 mg/dL   HIV 1/2 Ag/Ab (4th Gen)   Result Value Ref Range    HIV 1/2 Ag/Ab Negative Negative   Hepatitis C Antibody   Result Value Ref Range    Hepatitis  C Ab Negative Negative   HCV Virus Hold Specimen   Result Value Ref Range    HEP C Virus Hold Specimen Hold for HCV sendout    Pregnancy, urine rapid   Result Value Ref Range    Preg Test, Ur Negative          Imaging Results:  Imaging Results              US Abdomen Limited (Final result)  Result time 12/23/23 12:21:08      Final result by Adiel Jenkins MD (12/23/23 12:21:08)                   Impression:      Cholelithiasis without sonographic evidence of acute cholecystitis.    Mild hepatomegaly and sonographic findings concerning for steatosis.      Electronically signed by: Adiel Jenkins  Date:    12/23/2023  Time:    12:21               Narrative:    EXAMINATION:  US ABDOMEN LIMITED    CLINICAL HISTORY:  epigastric pain;    TECHNIQUE:  Limited ultrasound of the right upper quadrant of the abdomen (including pancreas, liver, gallbladder, common bile duct, and spleen) was performed.    COMPARISON:  CT abdomen pelvis same date    FINDINGS:  Liver: Increase in size measuring this 19.0 cm. Diffuse increased echogenicity.  No focal hepatic lesions.    Gallbladder: Numerous mobile gallstones including a prominent calcification in the fundus.  No wall thickening.  Sonographic Barroso's sign is negative.    Biliary system: The common duct is not dilated, measuring 3 mm.  No intrahepatic ductal dilatation.    Right Kidney: Normal in size measuring 10.7 cm.  No hydronephrosis.    Miscellaneous: No upper abdominal ascites.    Pancreas: Visualized portions appear within normal limits.    IVC: Within normal limits.                                       CT Head Without Contrast (Final result)  Result time 12/23/23 10:59:16      Final result by Aidel Jenkins MD (12/23/23 10:59:16)                   Impression:      No CT evidence of acute intracranial abnormality.    All CT scans at this facility use dose modulation, iterative reconstruction, and/or weight base dosing when appropriate to reduce radiation dose to as  low as reasonably achievable.      Electronically signed by: Adiel Jenkins  Date:    12/23/2023  Time:    10:59               Narrative:    EXAMINATION:  CT HEAD WITHOUT CONTRAST    CLINICAL HISTORY:  Mental status change, unknown cause;    TECHNIQUE:  Contiguous axial images were obtained from the skull base through the vertex without intravenous contrast.    COMPARISON:  None    FINDINGS:  No intracranial hemorrhage. No mass effect or midline shift. Normal parenchymal attenuation. The ventricles and sulci are normal in size and configuration. The visualized paranasal sinuses and mastoid air cells are clear.  No concerning osseous findings.                                       CT Renal Stone Study ABD Pelvis WO (Final result)  Result time 12/23/23 11:05:22      Final result by Adiel Jenkins MD (12/23/23 11:05:22)                   Impression:      No CT evidence of acute intra-abdominal abnormality, specifically no nephrolithiasis or hydronephrosis.    Diverticulosis coli without evidence of acute diverticulitis.    Wall calcification or gallstone involving the gallbladder fundus.  No CT evidence of acute cholecystitis.    Hepatic steatosis.    All CT scans at this facility use dose modulation, iterative reconstruction, and/or weight based dosing when appropriate to reduce radiation dose to as low as reasonable achievable.      Electronically signed by: Adiel Jenkins  Date:    12/23/2023  Time:    11:05               Narrative:    EXAMINATION:  CT RENAL STONE STUDY ABD PELVIS WO    CLINICAL HISTORY:  Flank pain, kidney stone suspected;    TECHNIQUE:  Low dose axial images, sagittal and coronal reformations were obtained from the lung bases to the pubic symphysis.  Contrast was not administered.    COMPARISON:  None    FINDINGS:  Heart: Normal in size. No pericardial effusion.    Lung Bases: Tiny focus of pleural thickening or scarring at the right middle lobe lung base.  No pulmonary consolidation or  pleural effusion.    Liver: Diffuse hypoattenuation of the liver concerning for steatosis.  There are likely areas of fatty sparing adjacent to the gallbladder.  No focal hepatic abnormality otherwise within limits of non contrasted technique.    Gallbladder: Calcification within or of the gallbladder fundus.  No wall thickening or pericholecystic inflammatory change.    Bile Ducts: No evidence of dilated ducts.    Pancreas: No mass or peripancreatic fat stranding.  Several small peripancreatic and portacaval lymph nodes identified.    Spleen: Within normal limits.    Adrenals: Within normal limits.    Kidneys/ Ureters: Normal in size and position.  No nephrolithiasis or hydronephrosis.  The ureters are nondilated.  No evidence of an intraureteral stone.    Bladder: No evidence of wall thickening.    Reproductive organs: Uterus is within normal limits.  Small hypodensities in the bilateral adnexa may reflect small follicles or tiny cyst.    GI Tract/Mesentery: The stomach and small bowel loops appear within normal.  Few scattered colonic diverticula noted in the descending colon.  No evidence of bowel obstruction or acute inflammatory change.  The appendix is surgically absent.    Peritoneal Space: No ascites.  No organized intra-abdominal fluid collection.  No free air.    Retroperitoneum: No pathologic adenopathy.    Abdominal wall: Within normal limits.    Vasculature: Abdominal aorta is normal in caliber.  No significant atherosclerotic calcifications.    Bones: No acute fracture.                                       X-Ray Chest AP Portable (Final result)  Result time 12/23/23 08:53:18      Final result by Adiel Jenkins MD (12/23/23 08:53:18)                   Impression:      No acute radiographic abnormality in the chest.      Electronically signed by: Adiel Jenkins  Date:    12/23/2023  Time:    08:53               Narrative:    EXAMINATION:  XR CHEST AP PORTABLE    CLINICAL  HISTORY:  Sepsis;    TECHNIQUE:  Single frontal view of the chest was performed.    COMPARISON:  Chest radiograph 08/27/2010    FINDINGS:  Cardiac leads project over the chest.  Cardiomediastinal silhouette is unchanged in size.  Trachea is midline.  No new pulmonary infiltrate or consolidation.  No large pleural effusion.  No pneumothorax.  The visualized osseous structures are intact.  There are degenerative changes of the spine.                                       The EKG was ordered, reviewed, and independently interpreted by the ED provider.  Interpretation time: 8:37AM  Rate: 69 BPM  Rhythm: normal sinus rhythm  Interpretation: Cannot rule out infarct, age undetermined. Abnormal ECG. No STEMI.           The Emergency Provider reviewed the vital signs and test results, which are outlined above.     ED Discussion     8:44 AM: Pt is now conscious. Pt c/o epigastric abd pain and vomiting which onset 2 weeks PTA. Pt in mild/severe distress. Upon examination, pt has epigastric tenderness.     11:22 PM: Discussed pt's case with Dr. Zuluaga ([facility]) who recommends to get US done. Dr. Zuluaga states she will come see pt shortly and does not think pt will require an ERCP.     11:52 PM: Discussed case with Dr. Zuluaga ([facility]). Dr. Zuluaga agrees with current care and management of pt and accepts admission.   Admitting Service: [facility]  Admitting Physician: Dr. Zuluaga  Admit to: Obs Med/Surg    11:52 PM: Re-evaluated pt. I have discussed test results, shared treatment plan, and the need for admission with patient and family at bedside. Pt and family express understanding at this time and agree with all information. All questions answered. Pt and family have no further questions or concerns at this time. Pt is ready for admit.         Medical Decision Making  Presents after a syncopal episode, and was unresponsive.  Once awake complains of epigastric chest pain with vomiting since last  night    Amount and/or Complexity of Data Reviewed  Labs: ordered. Decision-making details documented in ED Course.  Radiology: ordered. Decision-making details documented in ED Course.  ECG/medicine tests: ordered and independent interpretation performed. Decision-making details documented in ED Course.    Risk  Prescription drug management.  Decision regarding hospitalization.                ED Medication(s):  Medications   sodium chloride 0.9% flush 10 mL (has no administration in time range)   naloxone 0.4 mg/mL injection 0.02 mg (has no administration in time range)   glucose chewable tablet 16 g (has no administration in time range)   glucose chewable tablet 24 g (has no administration in time range)   glucagon (human recombinant) injection 1 mg (has no administration in time range)   0.9%  NaCl infusion (has no administration in time range)   acetaminophen tablet 650 mg (has no administration in time range)   HYDROcodone-acetaminophen 5-325 mg per tablet 1 tablet (has no administration in time range)   ondansetron disintegrating tablet 8 mg (has no administration in time range)   prochlorperazine injection Soln 5 mg (has no administration in time range)   dextrose 10% bolus 125 mL 125 mL (has no administration in time range)   dextrose 10% bolus 250 mL 250 mL (has no administration in time range)   enoxaparin injection 40 mg (has no administration in time range)   morphine injection 4 mg (4 mg Intravenous Given 12/23/23 0901)   ondansetron injection 4 mg (4 mg Intravenous Given 12/23/23 0857)   HYDROmorphone (PF) injection 1 mg (1 mg Intravenous Given 12/23/23 0951)       New Prescriptions    No medications on file               Scribe Attestation:   Scribe #1: I performed the above scribed service and the documentation accurately describes the services I performed. I attest to the accuracy of the note.     Attending:   Physician Attestation Statement for Scribe #1: I, Jose Ann MD, personally  performed the services described in this documentation, as scribed by Parker Barrett, in my presence, and it is both accurate and complete.           Clinical Impression       ICD-10-CM ICD-9-CM   1. Syncope, unspecified syncope type  R55 780.2   2. Weakness  R53.1 780.79   3. Epigastric pain  R10.13 789.06   4. Chest pain  R07.9 786.50       Disposition:   Disposition: Admitted  Condition: Serious        Jose Ann MD  12/23/23 0278

## 2023-12-23 NOTE — PHARMACY MED REC
"Admission Medication History     The home medication history was taken by Dallin Haider.    You may go to "Admission" then "Reconcile Home Medications" tabs to review and/or act upon these items.     The home medication list has been updated by the Pharmacy department.   Please read ALL comments highlighted in yellow.   Please address this information as you see fit.    Feel free to contact us if you have any questions or require assistance.      Medications listed below were obtained from: Patient/family and Analytic software- [employer]: FAMILY  (Not in a hospital admission)        Dallin Haider  [id number]    No current outpatient medications on file prior to encounter.                         .          "

## 2023-12-23 NOTE — ASSESSMENT & PLAN NOTE
- Unclear etiology - ? Acute gastritis from food poisoning  - NPO  - IV fluids  - pain control  - antiemetics  - Gallbladder US and repeat troponin pending

## 2023-12-23 NOTE — PROGRESS NOTES
My Depression Action Plan  Name: Elle Lambert   Date of Birth 1977  Date: 5/15/2017    My doctor: [facility]   [facility]  [address]  [place] [address] [phone]          GREEN    ZONE   Good Control    What it looks like:     Things are going generally well. You have normal up s and down s. You may even feel depressed from time to time, but bad moods usually last less than a day.   What you need to do:  1. Continue to care for yourself (see self care plan)  2. Check your depression survival kit and update it as needed  3. Follow your physician s recommendations including any medication.  4. Do not stop taking medication unless you consult with your physician first.           YELLOW         ZONE Getting Worse    What it looks like:     Depression is starting to interfere with your life.     It may be hard to get out of bed; you may be starting to isolate yourself from others.    Symptoms of depression are starting to last most all day and this has happened for several days.     You may have suicidal thoughts but they are not constant.   What you need to do:     1. Call your care team, your response to treatment will improve if you keep your care team informed of your progress. Yellow periods are signs an adjustment may need to be made.     2. Continue your self-care, even if you have to fake it!    3. Talk to someone in your support network    4. Open up your depression survival kit           RED    ZONE Medical Alert - Get Help    What it looks like:     Depression is seriously interfering with your life.     You may experience these or other symptoms: You can t get out of bed most days, can t work or engage in other necessary activities, you have trouble taking care of basic hygiene, or basic responsibilities, thoughts of suicide or death that will not go away, self-injurious behavior.     What you need to  Pharmacist Renal Dose Adjustment Note    Patsy Nassar is a 38 y.o. female being treated with the medication enoxaparin.    Patient Data:    Vital Signs (Most Recent):  Temp: 96.9 °F (36.1 °C) (12/23/23 0822)  Pulse: (!) 56 (12/23/23 1100)  Resp: 20 (12/23/23 1100)  BP: (!) 151/80 (12/23/23 1100)  SpO2: 100 % (12/23/23 1100) Vital Signs (72h Range):  Temp:  [96.9 °F (36.1 °C)]   Pulse:  [56-70]   Resp:  [14-28]   BP: (114-151)/(60-80)   SpO2:  [97 %-100 %]      Recent Labs   Lab 12/23/23  0825   CREATININE 0.8     Serum creatinine: 0.8 mg/dL 12/23/23 0825  Estimated creatinine clearance: 107.7 mL/min    Medication: enoxaparin dose: 40 mg frequency every 24 hours will be changed to medication: enoxaparin dose: 40 mg frequency: every 12 hours, for BMI> 40 and CrCl > 30    Pharmacist's Name: Klaudia Day     do:  1. Call your care team and request a same-day appointment. If they are not available (weekends or after hours) call your local crisis line, emergency room or 911.      Electronically signed by: Kimi Mata, May 15, 2017    Depression Self Care Plan / Survival Kit    Self-Care for Depression  Here s the deal. Your body and mind are really not as separate as most people think.  What you do and think affects how you feel and how you feel influences what you do and think. This means if you do things that people who feel good do, it will help you feel better.  Sometimes this is all it takes.  There is also a place for medication and therapy depending on how severe your depression is, so be sure to consult with your medical provider and/ or Behavioral Health Consultant if your symptoms are worsening or not improving.     In order to better manage my stress, I will:    Exercise  Get some form of exercise, every day. This will help reduce pain and release endorphins, the  feel good  chemicals in your brain. This is almost as good as taking antidepressants!  This is not the same as joining a gym and then never going! (they count on that by the way ) It can be as simple as just going for a walk or doing some gardening, anything that will get you moving.      Hygiene   Maintain good hygiene (Get out of bed in the morning, Make your bed, Brush your teeth, Take a shower, and Get dressed like you were going to work, even if you are unemployed).  If your clothes don't fit try to get ones that do.    Diet  I will strive to eat foods that are good for me, drink plenty of water, and avoid excessive sugar, caffeine, alcohol, and other mood-altering substances.  Some foods that are helpful in depression are: complex carbohydrates, B vitamins, flaxseed, fish or fish oil, fresh fruits and vegetables.    Psychotherapy  I agree to participate in Individual Therapy (if recommended).    Medication  If prescribed medications, I agree to  take them.  Missing doses can result in serious side effects.  I understand that drinking alcohol, or other illicit drug use, may cause potential side effects.  I will not stop my medication abruptly without first discussing it with my provider.    Staying Connected With Others  I will stay in touch with my friends, family members, and my primary care provider/team.    Use your imagination  Be creative.  We all have a creative side; it doesn t matter if it s oil painting, sand castles, or mud pies! This will also kick up the endorphins.    Witness Beauty  (AKA stop and smell the roses) Take a look outside, even in mid-winter. Notice colors, textures. Watch the squirrels and birds.     Service to others  Be of service to others.  There is always someone else in need.  By helping others we can  get out of ourselves  and remember the really important things.  This also provides opportunities for practicing all the other parts of the program.    Humor  Laugh and be silly!  Adjust your TV habits for less news and crime-drama and more comedy.    Control your stress  Try breathing deep, massage therapy, biofeedback, and meditation. Find time to relax each day.     My support system    Clinic Contact:  Phone number:    Contact 1:  Phone number:    Contact 2:  Phone number:    Church/:  Phone number:    Therapist:  Phone number:    Local crisis center:    Phone number:    Other community support:  Phone number:

## 2023-12-23 NOTE — H&P
[facility] - Emergency Dept.  [facility]  History & Physical    Patient Name: Patsy Nassar  MRN: [id number]  Patient Class: OP- Observation  Admission Date: 12/23/2023  Attending Physician: Jose Zuluaga MD   Primary Care Provider: [facility]         Patient information was obtained from patient and ER records.     Subjective:     Principal Problem: Abdominal pain/Nausea/vomiting.    Chief Complaint:   Chief Complaint   Patient presents with    Loss of Consciousness     Patient with c/o vomiting, responsive only to sternal rub on presentation to ED and diaphoretic.        HPI: Ms. Nassar is a 39 yo female with no past medical hx presented with epigastric pain, nausea and vomiting since 3AM.  She states she went out to eat last night were she ate steak and shrimp fajitas (no other family members ate the same food).  She felt fine after her meal, but around 3AM she woke up out of deep sleep with severe abdominal pain that shot straight down.  She continued to have several episodes of vomiting and passed out in the car on the way to the hospital.  Work up has been unrevealing thus far.  Her CT abdomen revealed a calcified gallbladder at the fundus, but no acute cholecystitis.  A US gallbladder is pending.  Remainder of her labs and imaging were unremarkable.  She does have b/l upper quadrant tenderness, but is no longer vomiting.  Unclear if this is related acute gastritis from her dinner last night.  Will admit for observation, npo, gallbladder US, pain control, and repeat troponins.    No past medical history on file.    No past surgical history on file.    Review of patient's allergies indicates:   Allergen Reactions    Penicillins        No current facility-administered medications on file prior to encounter.     No current outpatient medications on file prior to encounter.     Family History    None       Tobacco Use    Smoking status: Not on file    Smokeless tobacco: Not on file   Substance and Sexual  Activity    Alcohol use: Not on file    Drug use: Not on file    Sexual activity: Not on file     Review of Systems   Constitutional:  Negative for activity change, appetite change and fatigue.   Respiratory:  Negative for cough, chest tightness and shortness of breath.    Cardiovascular:  Negative for chest pain, palpitations and leg swelling.   Gastrointestinal:  Positive for abdominal pain, nausea and vomiting.   Musculoskeletal:  Negative for gait problem.   Neurological:  Negative for dizziness, weakness, light-headedness and headaches.   Psychiatric/Behavioral:  Negative for agitation and confusion. The patient is not nervous/anxious.    All other systems reviewed and are negative.    Objective:     Vital Signs (Most Recent):  Temp: 96.9 °F (36.1 °C) (12/23/23 0822)  Pulse: (!) 56 (12/23/23 1100)  Resp: 20 (12/23/23 1100)  BP: (!) 151/80 (12/23/23 1100)  SpO2: 100 % (12/23/23 1100) Vital Signs (24h Range):  Temp:  [96.9 °F (36.1 °C)] 96.9 °F (36.1 °C)  Pulse:  [56-70] 56  Resp:  [14-28] 20  SpO2:  [97 %-100 %] 100 %  BP: (114-151)/(60-80) 151/80     Weight: 104 kg (229 lb 4.5 oz)  Body mass index is 40.61 kg/m².     Physical Exam  Vitals and nursing note reviewed.   Constitutional:       Appearance: Normal appearance.   HENT:      Head: Normocephalic and atraumatic.      Nose: Nose normal.      Mouth/Throat:      Mouth: Mucous membranes are moist.   Eyes:      Extraocular Movements: Extraocular movements intact.      Conjunctiva/sclera: Conjunctivae normal.   Cardiovascular:      Rate and Rhythm: Normal rate and regular rhythm.      Pulses: Normal pulses.      Heart sounds: Normal heart sounds.   Pulmonary:      Effort: Pulmonary effort is normal.      Breath sounds: Normal breath sounds.   Abdominal:      General: Abdomen is flat. Bowel sounds are normal.      Palpations: Abdomen is soft.      Tenderness: There is abdominal tenderness.      Comments: Tenderness to palpation across the entire superior portion  of the abdomen.   Musculoskeletal:         General: Normal range of motion.      Cervical back: Normal range of motion and neck supple.   Skin:     General: Skin is warm.      Capillary Refill: Capillary refill takes less than 2 seconds.   Neurological:      Mental Status: She is alert and oriented to person, place, and time. Mental status is at baseline.   Psychiatric:         Mood and Affect: Mood normal.         Behavior: Behavior normal.                Significant Labs: All pertinent labs within the past 24 hours have been reviewed.  Recent Lab Results         12/23/23  1227   12/23/23  0829   12/23/23  0825        Influenza A, Molecular   Negative         Influenza B, Molecular   Negative         Benzodiazepines   Negative         Methadone metabolites   Negative         Phencyclidine   Negative         Procalcitonin     0.03  Comment: A concentration < 0.25 ng/mL represents a low risk of bacterial   infection.  Procalcitonin may not be accurate among patients with localized   infection, recent trauma or major surgery, immunosuppressed state,   invasive fungal infection, renal dysfunction. Decisions regarding   initiation or continuation of antibiotic therapy should not be based   solely on procalcitonin levels.         Albumin     4.2       Alcohol, Serum     <10       ALP     118       ALT     52       Amphetamine Screen, Ur   Negative         Anion Gap     12       Appearance, UA   Hazy         AST     23       Barbiturate Screen, Ur   Negative         Baso #     0.07       Basophil %     0.6       Bilirubin (UA)   Negative         BILIRUBIN TOTAL     0.5  Comment: For infants and newborns, interpretation of results should be based  on gestational age, weight and in agreement with clinical  observations.    Premature Infant recommended reference ranges:  Up to 24 hours.............<8.0 mg/dL  Up to 48 hours............<12.0 mg/dL  3-5 days..................<15.0 mg/dL  6-29 days.................<15.0  mg/dL         BNP     39  Comment: Values of less than 100 pg/ml are consistent with non-CHF populations.       BUN     12       Calcium     9.6       Chloride     105       CO2     24       Cocaine (Metab.)   Negative         Color, UA   Yellow         Creatinine     0.8       Creatinine, Urine   147.0         Differential Method     Automated       eGFR     >60       Eos #     0.1       Eosinophil %     1.0       Flu A & B Source   Nasal swab         Glucose     109       Glucose, UA   Negative         Gran # (ANC)     8.4       Gran %     73.0       Hematocrit     42.2       Hemoglobin     14.1       Hepatitis C Ab     Negative       HEP C Virus Hold Specimen     Hold for HCV sendout       HIV 1/2 Ag/Ab     Negative       Immature Grans (Abs)     0.03  Comment: Mild elevation in immature granulocytes is non specific and   can be seen in a variety of conditions including stress response,   acute inflammation, trauma and pregnancy. Correlation with other   laboratory and clinical findings is essential.         Immature Granulocytes     0.3       Ketones, UA   Negative         Lactate, Meet 0.9  Comment: Falsely low lactic acid results can be found in samples   containing >=13.0 mg/dL total bilirubin and/or >=3.5 mg/dL   direct bilirubin.       1.3  Comment: Falsely low lactic acid results can be found in samples   containing >=13.0 mg/dL total bilirubin and/or >=3.5 mg/dL   direct bilirubin.         Leukocytes, UA   Negative         Lipase     37       Lymph #     2.4       Lymph %     20.6       MCH     28.8       MCHC     33.4       MCV     86       Mono #     0.5       Mono %     4.5       MPV     10.8       NITRITE UA   Negative         nRBC     0       Occult Blood UA   Negative         Opiate Scrn, Ur   Negative         pH, UA   8.0         Platelet Count     365       Potassium     4.4       Preg Test, Ur   Negative         PROTEIN TOTAL     7.9       Protein, UA   Trace  Comment: Recommend a 24 hour urine  protein or a urine   protein/creatinine ratio if globulin induced proteinuria is  clinically suspected.           RBC     4.89       RDW     12.8       SARS-CoV-2 RNA, Amplification, Qual   Negative  Comment: This test utilizes isothermal nucleic acid amplification technology   to   detect the SARS-CoV-2 RdRp nucleic acid segment. The analytical   sensitivity   (limit of detection) is 500 copies/swab.    A POSITIVE result is indicative of the presence of SARS-CoV-2 RNA;   clinical   correlation with patient history and other diagnostic information is   necessary to determine patient infection status.    A NEGATIVE result means that SARS-CoV-2 nucleic acids are not present   above   the limit of detection. A NEGATIVE result should be treated as   presumptive.   It does not rule out the possibility of COVID-19 and should not be   the sole   basis for treatment decisions.    If COVID-19 is strongly suspected based on clinical and exposure   history,   re-testing using an alternate molecular assay should be considered.    This test is Food and Drug Administration (FDA) approved. Performance   characteristics of this has been independently verified by [facility] Department of Pathology and Laboratory Medicine.           Sodium     141       Specific [place], UA   1.025         Specimen UA   Urine, Clean Catch         Marijuana (THC) Metabolite   Negative         Toxicology Information   SEE COMMENT  Comment: This screen includes the following classes of drugs at the listed   cut-off:    Benzodiazepines 200 ng/ml  Methadone 300 ng/ml  Cocaine metabolite 300 ng/ml  Opiates 300 ng/ml  Barbiturates 200 ng/ml  Amphetamines 1000 ng/ml  Marijuana metabs (THC) 50 ng/ml  Phencyclidine (PCP) 25 ng/ml    This is a screening test. If results do not correlate with clinical   presentation, then a confirmatory send out test is advised.     This report is intended for use in clinical monitoring and management   of    patients. It is not intended for use in employment related drug   testing.           Troponin I     <0.006  Comment: The reference interval for Troponin I represents the 99th percentile   cutoff   for our facility and is consistent with 3rd generation assay   performance.         TSH     1.514       UROBILINOGEN UA   2.0-3.0         WBC     11.46               Significant Imaging:   US Abdomen Limited   Final Result      Cholelithiasis without sonographic evidence of acute cholecystitis.      Mild hepatomegaly and sonographic findings concerning for steatosis.         Electronically signed by: Adiel Jenkins   Date:    12/23/2023   Time:    12:21      CT Head Without Contrast   Final Result      No CT evidence of acute intracranial abnormality.      All CT scans at this facility use dose modulation, iterative reconstruction, and/or weight base dosing when appropriate to reduce radiation dose to as low as reasonably achievable.         Electronically signed by: Adiel Jenkins   Date:    12/23/2023   Time:    10:59      CT Renal Stone Study ABD Pelvis WO   Final Result      No CT evidence of acute intra-abdominal abnormality, specifically no nephrolithiasis or hydronephrosis.      Diverticulosis coli without evidence of acute diverticulitis.      Wall calcification or gallstone involving the gallbladder fundus.  No CT evidence of acute cholecystitis.      Hepatic steatosis.      All CT scans at this facility use dose modulation, iterative reconstruction, and/or weight based dosing when appropriate to reduce radiation dose to as low as reasonable achievable.         Electronically signed by: Adiel Jenkins   Date:    12/23/2023   Time:    11:05      X-Ray Chest AP Portable   Final Result      No acute radiographic abnormality in the chest.         Electronically signed by: Adiel Jenkins   Date:    12/23/2023   Time:    08:53         Assessment/Plan:     Abdominal pain  - Unclear etiology - ? Acute gastritis from  food poisoning  - NPO  - IV fluids  - pain control  - antiemetics  - Gallbladder US and repeat troponin pending        VTE Risk Mitigation (From admission, onward)           Ordered     enoxaparin injection 40 mg  Every 12 hours         12/23/23 1158     IP VTE HIGH RISK PATIENT  Once         12/23/23 1152     Place sequential compression device  Until discontinued         12/23/23 1152                       On 12/23/2023, patient should be placed in hospital observation services under my care.        Pharmacist Renal Dose Adjustment Note    Patsy Nassar is a 38 y.o. female being treated with the medication enoxaparin.    Patient Data:    Vital Signs (Most Recent):  Temp: 96.9 °F (36.1 °C) (12/23/23 0822)  Pulse: (!) 56 (12/23/23 1100)  Resp: 20 (12/23/23 1100)  BP: (!) 151/80 (12/23/23 1100)  SpO2: 100 % (12/23/23 1100) Vital Signs (72h Range):  Temp:  [96.9 °F (36.1 °C)]   Pulse:  [56-70]   Resp:  [14-28]   BP: (114-151)/(60-80)   SpO2:  [97 %-100 %]      Recent Labs   Lab 12/23/23  0825   CREATININE 0.8     Serum creatinine: 0.8 mg/dL 12/23/23 0825  Estimated creatinine clearance: 107.7 mL/min    Medication: enoxaparin dose: 40 mg frequency every 24 hours will be changed to medication: enoxaparin dose: 40 mg frequency: every 12 hours, for BMI> 40 and CrCl > 30    Pharmacist's Name: Klaudia Zuluaga MD  Department of Hospital Medicine  [facility] - Emergency Dept.

## 2023-12-23 NOTE — SUBJECTIVE & OBJECTIVE
No past medical history on file.    No past surgical history on file.    Review of patient's allergies indicates:   Allergen Reactions    Penicillins        No current facility-administered medications on file prior to encounter.     No current outpatient medications on file prior to encounter.     Family History    None       Tobacco Use    Smoking status: Not on file    Smokeless tobacco: Not on file   Substance and Sexual Activity    Alcohol use: Not on file    Drug use: Not on file    Sexual activity: Not on file     Review of Systems   Constitutional:  Negative for activity change, appetite change and fatigue.   Respiratory:  Negative for cough, chest tightness and shortness of breath.    Cardiovascular:  Negative for chest pain, palpitations and leg swelling.   Gastrointestinal:  Positive for abdominal pain, nausea and vomiting.   Musculoskeletal:  Negative for gait problem.   Neurological:  Negative for dizziness, weakness, light-headedness and headaches.   Psychiatric/Behavioral:  Negative for agitation and confusion. The patient is not nervous/anxious.    All other systems reviewed and are negative.    Objective:     Vital Signs (Most Recent):  Temp: 96.9 °F (36.1 °C) (12/23/23 0822)  Pulse: (!) 56 (12/23/23 1100)  Resp: 20 (12/23/23 1100)  BP: (!) 151/80 (12/23/23 1100)  SpO2: 100 % (12/23/23 1100) Vital Signs (24h Range):  Temp:  [96.9 °F (36.1 °C)] 96.9 °F (36.1 °C)  Pulse:  [56-70] 56  Resp:  [14-28] 20  SpO2:  [97 %-100 %] 100 %  BP: (114-151)/(60-80) 151/80     Weight: 104 kg (229 lb 4.5 oz)  Body mass index is 40.61 kg/m².     Physical Exam  Vitals and nursing note reviewed.   Constitutional:       Appearance: Normal appearance.   HENT:      Head: Normocephalic and atraumatic.      Nose: Nose normal.      Mouth/Throat:      Mouth: Mucous membranes are moist.   Eyes:      Extraocular Movements: Extraocular movements intact.      Conjunctiva/sclera: Conjunctivae normal.   Cardiovascular:      Rate and  Rhythm: Normal rate and regular rhythm.      Pulses: Normal pulses.      Heart sounds: Normal heart sounds.   Pulmonary:      Effort: Pulmonary effort is normal.      Breath sounds: Normal breath sounds.   Abdominal:      General: Abdomen is flat. Bowel sounds are normal.      Palpations: Abdomen is soft.      Tenderness: There is abdominal tenderness.      Comments: Tenderness to palpation across the entire superior portion of the abdomen.   Musculoskeletal:         General: Normal range of motion.      Cervical back: Normal range of motion and neck supple.   Skin:     General: Skin is warm.      Capillary Refill: Capillary refill takes less than 2 seconds.   Neurological:      Mental Status: She is alert and oriented to person, place, and time. Mental status is at baseline.   Psychiatric:         Mood and Affect: Mood normal.         Behavior: Behavior normal.                Significant Labs: All pertinent labs within the past 24 hours have been reviewed.  Recent Lab Results         12/23/23  1227   12/23/23  0829   12/23/23  0825        Influenza A, Molecular   Negative         Influenza B, Molecular   Negative         Benzodiazepines   Negative         Methadone metabolites   Negative         Phencyclidine   Negative         Procalcitonin     0.03  Comment: A concentration < 0.25 ng/mL represents a low risk of bacterial   infection.  Procalcitonin may not be accurate among patients with localized   infection, recent trauma or major surgery, immunosuppressed state,   invasive fungal infection, renal dysfunction. Decisions regarding   initiation or continuation of antibiotic therapy should not be based   solely on procalcitonin levels.         Albumin     4.2       Alcohol, Serum     <10       ALP     118       ALT     52       Amphetamine Screen, Ur   Negative         Anion Gap     12       Appearance, UA   Hazy         AST     23       Barbiturate Screen, Ur   Negative         Baso #     0.07       Basophil %      0.6       Bilirubin (UA)   Negative         BILIRUBIN TOTAL     0.5  Comment: For infants and newborns, interpretation of results should be based  on gestational age, weight and in agreement with clinical  observations.    Premature Infant recommended reference ranges:  Up to 24 hours.............<8.0 mg/dL  Up to 48 hours............<12.0 mg/dL  3-5 days..................<15.0 mg/dL  6-29 days.................<15.0 mg/dL         BNP     39  Comment: Values of less than 100 pg/ml are consistent with non-CHF populations.       BUN     12       Calcium     9.6       Chloride     105       CO2     24       Cocaine (Metab.)   Negative         Color, UA   Yellow         Creatinine     0.8       Creatinine, Urine   147.0         Differential Method     Automated       eGFR     >60       Eos #     0.1       Eosinophil %     1.0       Flu A & B Source   Nasal swab         Glucose     109       Glucose, UA   Negative         Gran # (ANC)     8.4       Gran %     73.0       Hematocrit     42.2       Hemoglobin     14.1       Hepatitis C Ab     Negative       HEP C Virus Hold Specimen     Hold for HCV sendout       HIV 1/2 Ag/Ab     Negative       Immature Grans (Abs)     0.03  Comment: Mild elevation in immature granulocytes is non specific and   can be seen in a variety of conditions including stress response,   acute inflammation, trauma and pregnancy. Correlation with other   laboratory and clinical findings is essential.         Immature Granulocytes     0.3       Ketones, UA   Negative         Lactate, Meet 0.9  Comment: Falsely low lactic acid results can be found in samples   containing >=13.0 mg/dL total bilirubin and/or >=3.5 mg/dL   direct bilirubin.       1.3  Comment: Falsely low lactic acid results can be found in samples   containing >=13.0 mg/dL total bilirubin and/or >=3.5 mg/dL   direct bilirubin.         Leukocytes, UA   Negative         Lipase     37       Lymph #     2.4       Lymph %     20.6       MCH      28.8       MCHC     33.4       MCV     86       Mono #     0.5       Mono %     4.5       MPV     10.8       NITRITE UA   Negative         nRBC     0       Occult Blood UA   Negative         Opiate Scrn, Ur   Negative         pH, UA   8.0         Platelet Count     365       Potassium     4.4       Preg Test, Ur   Negative         PROTEIN TOTAL     7.9       Protein, UA   Trace  Comment: Recommend a 24 hour urine protein or a urine   protein/creatinine ratio if globulin induced proteinuria is  clinically suspected.           RBC     4.89       RDW     12.8       SARS-CoV-2 RNA, Amplification, Qual   Negative  Comment: This test utilizes isothermal nucleic acid amplification technology   to   detect the SARS-CoV-2 RdRp nucleic acid segment. The analytical   sensitivity   (limit of detection) is 500 copies/swab.    A POSITIVE result is indicative of the presence of SARS-CoV-2 RNA;   clinical   correlation with patient history and other diagnostic information is   necessary to determine patient infection status.    A NEGATIVE result means that SARS-CoV-2 nucleic acids are not present   above   the limit of detection. A NEGATIVE result should be treated as   presumptive.   It does not rule out the possibility of COVID-19 and should not be   the sole   basis for treatment decisions.    If COVID-19 is strongly suspected based on clinical and exposure   history,   re-testing using an alternate molecular assay should be considered.    This test is Food and Drug Administration (FDA) approved. Performance   characteristics of this has been independently verified by [facility] Department of Pathology and Laboratory Medicine.           Sodium     141       Specific [place], UA   1.025         Specimen UA   Urine, Clean Catch         Marijuana (THC) Metabolite   Negative         Toxicology Information   SEE COMMENT  Comment: This screen includes the following classes of drugs at the listed    cut-off:    Benzodiazepines 200 ng/ml  Methadone 300 ng/ml  Cocaine metabolite 300 ng/ml  Opiates 300 ng/ml  Barbiturates 200 ng/ml  Amphetamines 1000 ng/ml  Marijuana metabs (THC) 50 ng/ml  Phencyclidine (PCP) 25 ng/ml    This is a screening test. If results do not correlate with clinical   presentation, then a confirmatory send out test is advised.     This report is intended for use in clinical monitoring and management   of   patients. It is not intended for use in employment related drug   testing.           Troponin I     <0.006  Comment: The reference interval for Troponin I represents the 99th percentile   cutoff   for our facility and is consistent with 3rd generation assay   performance.         TSH     1.514       UROBILINOGEN UA   2.0-3.0         WBC     11.46               Significant Imaging:   US Abdomen Limited   Final Result      Cholelithiasis without sonographic evidence of acute cholecystitis.      Mild hepatomegaly and sonographic findings concerning for steatosis.         Electronically signed by: Adiel Jenkins   Date:    12/23/2023   Time:    12:21      CT Head Without Contrast   Final Result      No CT evidence of acute intracranial abnormality.      All CT scans at this facility use dose modulation, iterative reconstruction, and/or weight base dosing when appropriate to reduce radiation dose to as low as reasonably achievable.         Electronically signed by: Adiel Jenkins   Date:    12/23/2023   Time:    10:59      CT Renal Stone Study ABD Pelvis WO   Final Result      No CT evidence of acute intra-abdominal abnormality, specifically no nephrolithiasis or hydronephrosis.      Diverticulosis coli without evidence of acute diverticulitis.      Wall calcification or gallstone involving the gallbladder fundus.  No CT evidence of acute cholecystitis.      Hepatic steatosis.      All CT scans at this facility use dose modulation, iterative reconstruction, and/or weight based dosing when  appropriate to reduce radiation dose to as low as reasonable achievable.         Electronically signed by: Adiel Jenkins   Date:    12/23/2023   Time:    11:05      X-Ray Chest AP Portable   Final Result      No acute radiographic abnormality in the chest.         Electronically signed by: Adiel Jenkins   Date:    12/23/2023   Time:    08:53

## 2023-12-24 PROBLEM — E66.01 SEVERE OBESITY (BMI >= 40): Chronic | Status: ACTIVE | Noted: 2023-12-24

## 2023-12-24 LAB
ALBUMIN SERPL BCP-MCNC: 3.3 G/DL (ref 3.5–5.2)
ALP SERPL-CCNC: 92 U/L (ref 55–135)
ALT SERPL W/O P-5'-P-CCNC: 33 U/L (ref 10–44)
ANION GAP SERPL CALC-SCNC: 11 MMOL/L (ref 8–16)
AST SERPL-CCNC: 14 U/L (ref 10–40)
BASOPHILS # BLD AUTO: 0.06 K/UL (ref 0–0.2)
BASOPHILS NFR BLD: 0.5 % (ref 0–1.9)
BILIRUB SERPL-MCNC: 0.7 MG/DL (ref 0.1–1)
BUN SERPL-MCNC: 8 MG/DL (ref 6–20)
CALCIUM SERPL-MCNC: 8.4 MG/DL (ref 8.7–10.5)
CHLORIDE SERPL-SCNC: 108 MMOL/L (ref 95–110)
CO2 SERPL-SCNC: 20 MMOL/L (ref 23–29)
CREAT SERPL-MCNC: 0.6 MG/DL (ref 0.5–1.4)
DIFFERENTIAL METHOD: ABNORMAL
EOSINOPHIL # BLD AUTO: 0.2 K/UL (ref 0–0.5)
EOSINOPHIL NFR BLD: 1.3 % (ref 0–8)
ERYTHROCYTE [DISTWIDTH] IN BLOOD BY AUTOMATED COUNT: 12.9 % (ref 11.5–14.5)
EST. GFR  (NO RACE VARIABLE): >60 ML/MIN/1.73 M^2
GLUCOSE SERPL-MCNC: 100 MG/DL (ref 70–110)
HCT VFR BLD AUTO: 37.5 % (ref 37–48.5)
HGB BLD-MCNC: 12.2 G/DL (ref 12–16)
IMM GRANULOCYTES # BLD AUTO: 0.05 K/UL (ref 0–0.04)
IMM GRANULOCYTES NFR BLD AUTO: 0.4 % (ref 0–0.5)
LYMPHOCYTES # BLD AUTO: 3.6 K/UL (ref 1–4.8)
LYMPHOCYTES NFR BLD: 28.8 % (ref 18–48)
MCH RBC QN AUTO: 28.6 PG (ref 27–31)
MCHC RBC AUTO-ENTMCNC: 32.5 G/DL (ref 32–36)
MCV RBC AUTO: 88 FL (ref 82–98)
MONOCYTES # BLD AUTO: 0.8 K/UL (ref 0.3–1)
MONOCYTES NFR BLD: 6.3 % (ref 4–15)
NEUTROPHILS # BLD AUTO: 7.8 K/UL (ref 1.8–7.7)
NEUTROPHILS NFR BLD: 62.7 % (ref 38–73)
NRBC BLD-RTO: 0 /100 WBC
PLATELET # BLD AUTO: 340 K/UL (ref 150–450)
PMV BLD AUTO: 10.9 FL (ref 9.2–12.9)
POTASSIUM SERPL-SCNC: 3.7 MMOL/L (ref 3.5–5.1)
PROT SERPL-MCNC: 6.3 G/DL (ref 6–8.4)
RBC # BLD AUTO: 4.26 M/UL (ref 4–5.4)
SODIUM SERPL-SCNC: 139 MMOL/L (ref 136–145)
WBC # BLD AUTO: 12.41 K/UL (ref 3.9–12.7)

## 2023-12-24 PROCEDURE — 36415 COLL VENOUS BLD VENIPUNCTURE: CPT | Performed by: FAMILY MEDICINE

## 2023-12-24 PROCEDURE — 63600175 PHARM REV CODE 636 W HCPCS: Performed by: FAMILY MEDICINE

## 2023-12-24 PROCEDURE — 85025 COMPLETE CBC W/AUTO DIFF WBC: CPT | Performed by: FAMILY MEDICINE

## 2023-12-24 PROCEDURE — 96372 THER/PROPH/DIAG INJ SC/IM: CPT | Performed by: FAMILY MEDICINE

## 2023-12-24 PROCEDURE — 96361 HYDRATE IV INFUSION ADD-ON: CPT

## 2023-12-24 PROCEDURE — 96360 HYDRATION IV INFUSION INIT: CPT | Mod: 59

## 2023-12-24 PROCEDURE — 25000003 PHARM REV CODE 250: Performed by: FAMILY MEDICINE

## 2023-12-24 PROCEDURE — 80053 COMPREHEN METABOLIC PANEL: CPT | Performed by: FAMILY MEDICINE

## 2023-12-24 PROCEDURE — G0378 HOSPITAL OBSERVATION PER HR: HCPCS

## 2023-12-24 PROCEDURE — 25000003 PHARM REV CODE 250: Performed by: INTERNAL MEDICINE

## 2023-12-24 RX ORDER — DOCUSATE SODIUM 100 MG/1
100 CAPSULE, LIQUID FILLED ORAL 2 TIMES DAILY
Status: DISCONTINUED | OUTPATIENT
Start: 2023-12-24 | End: 2023-12-25 | Stop reason: HOSPADM

## 2023-12-24 RX ORDER — SUCRALFATE 1 G/1
1 TABLET ORAL
Status: DISCONTINUED | OUTPATIENT
Start: 2023-12-24 | End: 2023-12-25 | Stop reason: HOSPADM

## 2023-12-24 RX ORDER — PANTOPRAZOLE SODIUM 40 MG/1
40 TABLET, DELAYED RELEASE ORAL DAILY
Status: DISCONTINUED | OUTPATIENT
Start: 2023-12-24 | End: 2023-12-25 | Stop reason: HOSPADM

## 2023-12-24 RX ADMIN — HYDROCODONE BITARTRATE AND ACETAMINOPHEN 1 TABLET: 5; 325 TABLET ORAL at 09:12

## 2023-12-24 RX ADMIN — SUCRALFATE 1 G: 1 TABLET ORAL at 10:12

## 2023-12-24 RX ADMIN — ENOXAPARIN SODIUM 40 MG: 40 INJECTION SUBCUTANEOUS at 08:12

## 2023-12-24 RX ADMIN — HYDROCODONE BITARTRATE AND ACETAMINOPHEN 1 TABLET: 5; 325 TABLET ORAL at 05:12

## 2023-12-24 RX ADMIN — PANTOPRAZOLE SODIUM 40 MG: 40 TABLET, DELAYED RELEASE ORAL at 09:12

## 2023-12-24 RX ADMIN — SUCRALFATE 1 G: 1 TABLET ORAL at 03:12

## 2023-12-24 NOTE — PLAN OF CARE
Patient NPO with sips with meds. Denies N/V. Patient AAOx4, walks independently with steady gait. Daughter at bedside.IVF as ordered,  Patient pain managed adequately. Pt encouraged to make frequent weight shift movements to prevent breakdown. Personal items within reach, call light/ room phone in reach. Patient instructed to use call light/ room phone for assistance with needs. Communication board updated. Bed alarm in place, bed to lowest level, safety precautions in place. Pt verbalized understanding.  Pt remains free of injury, no s/s of acute distress.

## 2023-12-24 NOTE — PROGRESS NOTES
[facility] Medicine  Progress Note    Patient Name: Patsy Nassar  MRN: [id number]  Patient Class: OP- Observation   Admission Date: 12/23/2023  Length of Stay: 0 days  Attending Physician: Mindy Godfrey MD  Primary Care Provider: Skyla, Primary Doctor        Subjective:     Principal Problem:<principal problem not specified>        HPI:  Ms. Nassar is a 37 yo female with no past medical hx presented with epigastric pain, nausea and vomiting since 3AM.  She states she went out to eat last night were she ate steak and shrimp fajitas (no other family members ate the same food).  She felt fine after her meal, but around 3AM she woke up out of deep sleep with severe abdominal pain that shot straight down.  She continued to have several episodes of vomiting and passed out in the car on the way to the hospital.  Work up has been unrevealing thus far.  Her CT abdomen revealed a calcified gallbladder at the fundus, but no acute cholecystitis.  A US gallbladder is pending.  Remainder of her labs and imaging were unremarkable.  She does have b/l upper quadrant tenderness, but is no longer vomiting.  Unclear if this is related acute gastritis from her dinner last night.  Will admit for observation, npo, gallbladder US, pain control, and repeat troponins.    Overview/Hospital Course:  Trop neg x 3, RUQ Ultrasound showed cholelithiaisis without cholecystitis.     Interval History: NAEON. Pt seen and examined with daughter at bedside. PT sleeping comfortably, when awoken, reports 4/10 midepigastric pain, sharp in nature. Denies further nausea, vomiting.      Review of Systems  Objective:     Vital Signs (Most Recent):  Temp: 98.6 °F (37 °C) (12/24/23 0824)  Pulse: (!) 54 (12/24/23 0824)  Resp: 17 (12/24/23 0921)  BP: (!) 102/58 (12/24/23 0824)  SpO2: 99 % (12/24/23 0824) Vital Signs (24h Range):  Temp:  [97.9 °F (36.6 °C)-99.1 °F (37.3 °C)] 98.6 °F (37 °C)  Pulse:  [38-61] 54  Resp:  [14-24] 17  SpO2:  [97 %-100 %]  99 %  BP: ()/(51-80) 102/58     Weight: 104 kg (229 lb 4.5 oz)  Body mass index is 40.61 kg/m².    Intake/Output Summary (Last 24 hours) at 12/24/2023 0930  Last data filed at 12/24/2023 0549  Gross per 24 hour   Intake 1733.33 ml   Output 400 ml   Net 1333.33 ml         Physical Exam  Vitals and nursing note reviewed.   Constitutional:       General: She is not in acute distress.     Appearance: She is obese. She is not ill-appearing.   Cardiovascular:      Rate and Rhythm: Regular rhythm. Bradycardia present.      Heart sounds: No murmur heard.     No friction rub. No gallop.   Pulmonary:      Effort: Pulmonary effort is normal.      Breath sounds: Normal breath sounds. No wheezing, rhonchi or rales.   Abdominal:      Comments: Mild epigastric TTP, no rebound/guarding, + bowel sounds    Musculoskeletal:      Right lower leg: No edema.      Left lower leg: No edema.   Neurological:      Mental Status: She is alert and oriented to person, place, and time. Mental status is at baseline.             Significant Labs: All pertinent labs within the past 24 hours have been reviewed.    Significant Imaging: I have reviewed all pertinent imaging results/findings within the past 24 hours.    Assessment/Plan:      Severe obesity (BMI >= 40)  Body mass index is 40.61 kg/m². Morbid obesity complicates all aspects of disease management from diagnostic modalities to treatment. Weight loss encouraged and health benefits explained to patient.         Abdominal pain  - Unclear etiology, RUQ U/S showed cholelithiasis without cholecystitis, labs fairly unremarkable  - Start PPI + carafate for presumed gastritis   - will advance diet to clears; ADAT   - Monitor exam       VTE Risk Mitigation (From admission, onward)           Ordered     enoxaparin injection 40 mg  Every 12 hours         12/23/23 1158     IP VTE HIGH RISK PATIENT  Once         12/23/23 1152     Place sequential compression device  Until discontinued          12/23/23 1152                    Discharge Planning   DIONISIO:      Code Status: Full Code   Is the patient medically ready for discharge?:     Reason for patient still in hospital (select all that apply): Patient trending condition                     Mindy Godfrey MD  Department of Hospital Medicine   [facility]

## 2023-12-24 NOTE — ASSESSMENT & PLAN NOTE
Body mass index is 40.61 kg/m². Morbid obesity complicates all aspects of disease management from diagnostic modalities to treatment. Weight loss encouraged and health benefits explained to patient.

## 2023-12-24 NOTE — ASSESSMENT & PLAN NOTE
- Unclear etiology, RUQ U/S showed cholelithiasis without cholecystitis, labs fairly unremarkable  - Start PPI + carafate for presumed gastritis   - will advance diet to clears; ADAT   - Monitor exam

## 2023-12-24 NOTE — HOSPITAL COURSE
Patient was placed in observation for further evaluation of abdominal pain, nausea and vomiting prior to arrival. CT abdomen revealed a calcified gallbladder at the fundus, but no acute cholecystitis. Labs were stable. Abdominal ultrasound again cholelithiasis but with no evidence to suggest cholecystitis. Trop neg x 3. Etiology of symptoms were unclear presumed gastritis. She was managed conservatively with PPI, antiemetics, and gentle hydration. Diet advanced and tolerated well. She is stable to discharge home and was asked to follow up with PCP in 3 days.

## 2023-12-25 VITALS
RESPIRATION RATE: 18 BRPM | HEIGHT: 63 IN | DIASTOLIC BLOOD PRESSURE: 52 MMHG | OXYGEN SATURATION: 95 % | HEART RATE: 63 BPM | BODY MASS INDEX: 40.62 KG/M2 | SYSTOLIC BLOOD PRESSURE: 102 MMHG | WEIGHT: 229.25 LBS | TEMPERATURE: 98 F

## 2023-12-25 LAB
ALBUMIN SERPL BCP-MCNC: 3.2 G/DL (ref 3.5–5.2)
ALP SERPL-CCNC: 86 U/L (ref 55–135)
ALT SERPL W/O P-5'-P-CCNC: 27 U/L (ref 10–44)
ANION GAP SERPL CALC-SCNC: 8 MMOL/L (ref 8–16)
AST SERPL-CCNC: 15 U/L (ref 10–40)
BASOPHILS # BLD AUTO: 0.08 K/UL (ref 0–0.2)
BASOPHILS NFR BLD: 0.8 % (ref 0–1.9)
BILIRUB SERPL-MCNC: 0.4 MG/DL (ref 0.1–1)
BUN SERPL-MCNC: 5 MG/DL (ref 6–20)
CALCIUM SERPL-MCNC: 8.5 MG/DL (ref 8.7–10.5)
CHLORIDE SERPL-SCNC: 108 MMOL/L (ref 95–110)
CO2 SERPL-SCNC: 23 MMOL/L (ref 23–29)
CREAT SERPL-MCNC: 0.7 MG/DL (ref 0.5–1.4)
DIFFERENTIAL METHOD: ABNORMAL
EOSINOPHIL # BLD AUTO: 0.2 K/UL (ref 0–0.5)
EOSINOPHIL NFR BLD: 2.3 % (ref 0–8)
ERYTHROCYTE [DISTWIDTH] IN BLOOD BY AUTOMATED COUNT: 12.7 % (ref 11.5–14.5)
EST. GFR  (NO RACE VARIABLE): >60 ML/MIN/1.73 M^2
GLUCOSE SERPL-MCNC: 97 MG/DL (ref 70–110)
HCT VFR BLD AUTO: 35.2 % (ref 37–48.5)
HGB BLD-MCNC: 11.7 G/DL (ref 12–16)
IMM GRANULOCYTES # BLD AUTO: 0.05 K/UL (ref 0–0.04)
IMM GRANULOCYTES NFR BLD AUTO: 0.5 % (ref 0–0.5)
LYMPHOCYTES # BLD AUTO: 4.1 K/UL (ref 1–4.8)
LYMPHOCYTES NFR BLD: 43.6 % (ref 18–48)
MCH RBC QN AUTO: 29.1 PG (ref 27–31)
MCHC RBC AUTO-ENTMCNC: 33.2 G/DL (ref 32–36)
MCV RBC AUTO: 88 FL (ref 82–98)
MONOCYTES # BLD AUTO: 0.6 K/UL (ref 0.3–1)
MONOCYTES NFR BLD: 6.2 % (ref 4–15)
NEUTROPHILS # BLD AUTO: 4.4 K/UL (ref 1.8–7.7)
NEUTROPHILS NFR BLD: 46.6 % (ref 38–73)
NRBC BLD-RTO: 0 /100 WBC
PLATELET # BLD AUTO: 291 K/UL (ref 150–450)
PMV BLD AUTO: 10.9 FL (ref 9.2–12.9)
POTASSIUM SERPL-SCNC: 3.7 MMOL/L (ref 3.5–5.1)
PROT SERPL-MCNC: 6 G/DL (ref 6–8.4)
RBC # BLD AUTO: 4.02 M/UL (ref 4–5.4)
SODIUM SERPL-SCNC: 139 MMOL/L (ref 136–145)
WBC # BLD AUTO: 9.43 K/UL (ref 3.9–12.7)

## 2023-12-25 PROCEDURE — 85025 COMPLETE CBC W/AUTO DIFF WBC: CPT | Performed by: FAMILY MEDICINE

## 2023-12-25 PROCEDURE — 25000003 PHARM REV CODE 250: Performed by: NURSE PRACTITIONER

## 2023-12-25 PROCEDURE — 80053 COMPREHEN METABOLIC PANEL: CPT | Performed by: FAMILY MEDICINE

## 2023-12-25 PROCEDURE — 25000003 PHARM REV CODE 250: Performed by: INTERNAL MEDICINE

## 2023-12-25 PROCEDURE — 36415 COLL VENOUS BLD VENIPUNCTURE: CPT | Performed by: FAMILY MEDICINE

## 2023-12-25 PROCEDURE — G0378 HOSPITAL OBSERVATION PER HR: HCPCS

## 2023-12-25 RX ORDER — HYDROCODONE BITARTRATE AND ACETAMINOPHEN 5; 325 MG/1; MG/1
1 TABLET ORAL EVERY 8 HOURS PRN
Status: DISCONTINUED | OUTPATIENT
Start: 2023-12-25 | End: 2023-12-25 | Stop reason: HOSPADM

## 2023-12-25 RX ADMIN — SUCRALFATE 1 G: 1 TABLET ORAL at 06:12

## 2023-12-25 RX ADMIN — DOCUSATE SODIUM 100 MG: 100 CAPSULE, LIQUID FILLED ORAL at 02:12

## 2023-12-25 RX ADMIN — PANTOPRAZOLE SODIUM 40 MG: 40 TABLET, DELAYED RELEASE ORAL at 09:12

## 2023-12-25 RX ADMIN — SUCRALFATE 1 G: 1 TABLET ORAL at 11:12

## 2023-12-25 RX ADMIN — DOCUSATE SODIUM 100 MG: 100 CAPSULE, LIQUID FILLED ORAL at 09:12

## 2023-12-25 NOTE — PLAN OF CARE
[facility]  Discharge Final Note    Primary Care Provider: No, Primary Doctor    Expected Discharge Date: 12/25/2023    Final Discharge Note (most recent)       Final Note - 12/25/23 1039          Final Note    Assessment Type Final Discharge Note     Anticipated Discharge Disposition Home or Self Care

## 2023-12-25 NOTE — PLAN OF CARE
Patient ambulated in hallway multiple times. Patient tolerated vanilla pudding, saltine crackers, water. No pain medications given this shift. Patient denies having epigastric pain episode this shift.  Patient pain managed adequately. Pt encouraged to make frequent weight shift movements to prevent breakdown. Personal items within reach, call light/ room phone in reach. Patient instructed to use call light/ room phone for assistance with needs. Communication board updated. Bed alarm in place, bed to lowest level, safety precautions in place. Pt verbalized understanding.  Pt remains free of injury, no s/s of acute distress.

## 2023-12-25 NOTE — PLAN OF CARE
[facility]  Discharge Assessment    Primary Care Provider: No, Primary Doctor     Discharge Assessment (most recent)       BRIEF DISCHARGE ASSESSMENT - 12/25/23 0932          Discharge Planning    Assessment Type Discharge Planning Brief Assessment     Resource/Environmental Concerns none     Support Systems Parent;Children     Equipment Currently Used at Home none     Current Living Arrangements home     Patient/Family Anticipates Transition to home with family     Discharge Plan A Home with family

## 2023-12-25 NOTE — DISCHARGE SUMMARY
[facility] Medicine  Discharge Summary      Patient Name: Patsy Nassar  MRN: [id number]  EULOGIO: [id number]  Patient Class: OP- Observation  Admission Date: 12/23/2023  Hospital Length of Stay: 0 days  Discharge Date and Time:  12/25/2023 5:50 PM  Attending Physician: Skyla att. providers found   Discharging Provider: Ismael Gonzales NP  Primary Care Provider: Skyla Primary Doctor    Primary Care Team: [facility] MEDICINE D    HPI:   Ms. Nassar is a 39 yo female with no past medical hx presented with epigastric pain, nausea and vomiting since 3AM.  She states she went out to eat last night were she ate steak and shrimp fajitas (no other family members ate the same food).  She felt fine after her meal, but around 3AM she woke up out of deep sleep with severe abdominal pain that shot straight down.  She continued to have several episodes of vomiting and passed out in the car on the way to the hospital.  Work up has been unrevealing thus far.  Her CT abdomen revealed a calcified gallbladder at the fundus, but no acute cholecystitis.  A US gallbladder is pending.  Remainder of her labs and imaging were unremarkable.  She does have b/l upper quadrant tenderness, but is no longer vomiting.  Unclear if this is related acute gastritis from her dinner last night.  Will admit for observation, npo, gallbladder US, pain control, and repeat troponins.    * No surgery found *      Hospital Course:   Patient was placed in observation for further evaluation of abdominal pain, nausea and vomiting prior to arrival. CT abdomen revealed a calcified gallbladder at the fundus, but no acute cholecystitis. Labs were stable. Abdominal ultrasound again cholelithiasis but with no evidence to suggest cholecystitis. Trop neg x 3. Etiology of symptoms were unclear presumed gastritis. She was managed conservatively with PPI, antiemetics, and gentle hydration. Diet advanced and tolerated well. She is stable to discharge home and was  asked to follow up with PCP in 3 days.      Goals of Care Treatment Preferences:  Code Status: Full Code      Consults:     No new Assessment & Plan notes have been filed under this hospital service since the last note was generated.  Service: Hospital Medicine    Final Active Diagnoses:    Diagnosis Date Noted POA    PRINCIPAL PROBLEM:  Abdominal pain [R10.9] 12/23/2023 Yes    Severe obesity (BMI >= 40) [E66.01] 12/24/2023 Yes     Chronic      Problems Resolved During this Admission:       Discharged Condition: stable    Disposition: Home or Self Care    Follow Up:    Patient Instructions:   No discharge procedures on file.    Significant Diagnostic Studies: Labs: CMP   Recent Labs   Lab 12/24/23  0550 12/25/23  0546    139   K 3.7 3.7    108   CO2 20* 23    97   BUN 8 5*   CREATININE 0.6 0.7   CALCIUM 8.4* 8.5*   PROT 6.3 6.0   ALBUMIN 3.3* 3.2*   BILITOT 0.7 0.4   ALKPHOS 92 86   AST 14 15   ALT 33 27   ANIONGAP 11 8    and CBC   Recent Labs   Lab 12/24/23  0550 12/25/23  0546   WBC 12.41 9.43   HGB 12.2 11.7*   HCT 37.5 35.2*    291       Pending Diagnostic Studies:       None           Medications:  Reconciled Home Medications:      Medication List      You have not been prescribed any medications.         Indwelling Lines/Drains at time of discharge:   Lines/Drains/Airways       None                   Time spent on the discharge of patient: >35 minutes         Ismael Gonzales NP  Department of Hospital Medicine  [facility]

## 2023-12-25 NOTE — PLAN OF CARE
Patient discharged Home with family. AVS given and explained to patient. Patient verbalizes understanding. IV removed with catheter intact. Prescriptions delivered to bedside. Follow up appointments arranged.

## 2023-12-28 LAB
BACTERIA BLD CULT: NORMAL
BACTERIA BLD CULT: NORMAL
POCT GLUCOSE: 110 MG/DL (ref 70–110)